# Patient Record
Sex: FEMALE | Race: WHITE | Employment: UNEMPLOYED | URBAN - METROPOLITAN AREA
[De-identification: names, ages, dates, MRNs, and addresses within clinical notes are randomized per-mention and may not be internally consistent; named-entity substitution may affect disease eponyms.]

---

## 2022-12-10 ENCOUNTER — HOSPITAL ENCOUNTER (EMERGENCY)
Age: 37
Discharge: HOME | End: 2022-12-10
Attending: EMERGENCY MEDICINE
Payer: MEDICARE

## 2022-12-10 VITALS
WEIGHT: 168 LBS | HEART RATE: 83 BPM | DIASTOLIC BLOOD PRESSURE: 77 MMHG | RESPIRATION RATE: 18 BRPM | OXYGEN SATURATION: 98 % | SYSTOLIC BLOOD PRESSURE: 131 MMHG | TEMPERATURE: 98.1 F

## 2022-12-10 DIAGNOSIS — J06.9 UPPER RESPIRATORY TRACT INFECTION, UNSPECIFIED TYPE: ICD-10-CM

## 2022-12-10 DIAGNOSIS — K08.89 PAIN, DENTAL: Primary | ICD-10-CM

## 2022-12-10 PROCEDURE — 6360000002 HC RX W HCPCS: Performed by: EMERGENCY MEDICINE

## 2022-12-10 PROCEDURE — 99284 EMERGENCY DEPT VISIT MOD MDM: CPT

## 2022-12-10 PROCEDURE — 96372 THER/PROPH/DIAG INJ SC/IM: CPT

## 2022-12-10 PROCEDURE — 6370000000 HC RX 637 (ALT 250 FOR IP): Performed by: EMERGENCY MEDICINE

## 2022-12-10 RX ORDER — NAPROXEN 500 MG/1
500 TABLET ORAL 2 TIMES DAILY WITH MEALS
Qty: 60 TABLET | Refills: 0 | Status: SHIPPED | OUTPATIENT
Start: 2022-12-10

## 2022-12-10 RX ORDER — GUAIFENESIN 600 MG/1
600 TABLET, EXTENDED RELEASE ORAL ONCE
Status: COMPLETED | OUTPATIENT
Start: 2022-12-10 | End: 2022-12-10

## 2022-12-10 RX ORDER — AMOXICILLIN 250 MG/1
500 CAPSULE ORAL ONCE
Status: COMPLETED | OUTPATIENT
Start: 2022-12-10 | End: 2022-12-10

## 2022-12-10 RX ORDER — AMOXICILLIN 250 MG/1
500 CAPSULE ORAL 2 TIMES DAILY
Qty: 28 CAPSULE | Refills: 0 | Status: SHIPPED | OUTPATIENT
Start: 2022-12-10 | End: 2022-12-17

## 2022-12-10 RX ORDER — KETOROLAC TROMETHAMINE 30 MG/ML
30 INJECTION, SOLUTION INTRAMUSCULAR; INTRAVENOUS ONCE
Status: COMPLETED | OUTPATIENT
Start: 2022-12-10 | End: 2022-12-10

## 2022-12-10 RX ORDER — GUAIFENESIN AND DEXTROMETHORPHAN HYDROBROMIDE 600; 30 MG/1; MG/1
1 TABLET, EXTENDED RELEASE ORAL 2 TIMES DAILY
Qty: 28 TABLET | Refills: 0 | Status: SHIPPED | OUTPATIENT
Start: 2022-12-10

## 2022-12-10 RX ADMIN — KETOROLAC TROMETHAMINE 30 MG: 30 INJECTION, SOLUTION INTRAMUSCULAR; INTRAVENOUS at 04:44

## 2022-12-10 RX ADMIN — AMOXICILLIN 500 MG: 250 CAPSULE ORAL at 04:44

## 2022-12-10 RX ADMIN — GUAIFENESIN 600 MG: 600 TABLET, EXTENDED RELEASE ORAL at 04:44

## 2022-12-10 NOTE — ED PROVIDER NOTES
Triage Chief Complaint:   Otalgia (Right ear) and URI (X3 days)    [place]:  Jayleen Gurrola is a 40 y.o. female that presents with earache, dental pains, sore throat and cough. Patient was in baseline state of health until approximately 3 days ago when she developed a dry cough. Patient reports that both of her ear started to hurt with right ear worse than left ear. Patient reports that she feels like her ears are \"popping\". Additionally, patient reports diffuse dental pains to bilateral lower jawline's. Cough is mostly dry and nonproductive. No fevers or chills. No vomiting or diarrhea. Patient does live with a roommate currently who son is sick with respiratory symptoms. Patient does have underlying cerebral palsy but no known pulmonary disease. Patient does vape. ROS:  General:  No fevers, no chills, no weakness  Eyes:  No recent vison changes, + discharge  ENT:  + sore throat, + nasal congestion, no hearing changes, + dental pain, + earache  Cardiovascular:  No chest pain, no palpitations  Respiratory:  No shortness of breath, + cough, no wheezing  Gastrointestinal:  No pain, no nausea, no vomiting, no diarrhea  Musculoskeletal:  No muscle pain, no joint pain  Skin:  No rash, no pruritis, no easy bruising  Neurologic:  No speech problems, no headache, no extremity numbness, no extremity tingling, no extremity weakness  Psychiatric:  No anxiety  Genitourinary:  No dysuria, no hematuria  Endocrine:  No unexpected weight gain, no unexpected weight loss  Extremities:  no edema, no pain    History reviewed. No pertinent past medical history. History reviewed. No pertinent surgical history. History reviewed. No pertinent family history.   Social History     Socioeconomic History    Marital status: Single     Spouse name: Not on file    Number of children: Not on file    Years of education: Not on file    Highest education level: Not on file   Occupational History    Not on file   Tobacco Use    Smoking status: Every Day     Types: Cigarettes    Smokeless tobacco: Never   Vaping Use    Vaping Use: Every day    Substances: Nicotine   Substance and Sexual Activity    Alcohol use: Never    Drug use: Never    Sexual activity: Not on file   Other Topics Concern    Not on file   Social History Narrative    Not on file     Social Determinants of Health     Financial Resource Strain: Not on file   Food Insecurity: Not on file   Transportation Needs: Not on file   Physical Activity: Not on file   Stress: Not on file   Social Connections: Not on file   Intimate Partner Violence: Not on file   Housing Stability: Not on file     Current Facility-Administered Medications   Medication Dose Route Frequency Provider Last Rate Last Admin    amoxicillin (AMOXIL) capsule 500 mg  500 mg Oral Once Darshana Iglesias MD        ketorolac (TORADOL) injection 30 mg  30 mg IntraMUSCular Once Darshana Iglesias MD        guaiFENesin [facility]) extended release tablet 600 mg  600 mg Oral Once Darshana Iglesias MD         Current Outpatient Medications   Medication Sig Dispense Refill    amoxicillin (AMOXIL) 250 MG capsule Take 2 capsules by mouth 2 times daily for 7 days 28 capsule 0    naproxen (NAPROSYN) 500 MG tablet Take 1 tablet by mouth 2 times daily (with meals) 60 tablet 0    Dextromethorphan-guaiFENesin (MUCINEX DM)  MG TB12 Take 1 tablet by mouth 2 times daily 28 tablet 0     Allergies   Allergen Reactions    Codeine Hives    Morphine Nausea And Vomiting       Nursing Notes Reviewed    Physical Exam:  ED Triage Vitals [12/10/22 6183]   Enc Vitals Group      /77      Heart Rate 83      Resp 18      Temp 98.1 °F (36.7 °C)      Temp Source Oral      SpO2 98 %      Weight 168 lb (76.2 kg)      Height       Head Circumference       Peak Flow       Pain Score       Pain Loc       Pain Edu? Excl. in [address]? My pulse ox interpretation is - normal    General appearance:  No acute distress.   Appears slightly deconditioned but overall nontoxic. Skin:  Warm. Dry. Eye:  Extraocular movements intact. Ears, nose, mouth and throat: TMs are clear bilaterally and pearly white. Oral mucosa moist.  There is overall very poor oral hygiene. There are scattered plaques and caries. There is gumline erythema diffusely. No periapical abscesses or areas of fluctuance. Missing right upper premolar tooth. Soft floor of mouth. Tolerating oral secretions. Posterior oropharynx is with some mild erythema but no edema or exudates. No hot potato voice. Neck:  Trachea midline. No meningismus or rigidity. Extremity:  No swelling. Normal ROM     Heart:  Regular rate and rhythm, normal S1 & S2, no extra heart sounds. Perfusion:  Intact. Respiratory:  Lungs clear to auscultation bilaterally. Respirations nonlabored. Moving good air bilaterally. Speaking clearly in full sentences. Abdominal:  Normal bowel sounds. Soft. Nontender. Non distended. Back:  No CVA tenderness to palpation     Neurological:  Alert and oriented times 3. Moving x4 extremities. Psychiatric:  Appropriate    I have reviewed and interpreted all of the currently available lab results from this visit (if applicable):  No results found for this visit on 12/10/22. Radiographs (if obtained):  [] The following radiograph was interpreted by myself in the absence of a radiologist:   [] Radiologist's Report Reviewed:  No orders to display         EKG (if obtained): (All EKG's are interpreted by myself in the absence of a cardiologist)    Chart review shows recent radiographs:  No results found. MDM:  Pt presents as above. Emergent conditions considered. Presentation prompted initial history and physical.  Presentation consistent with diffuse dental pains with overall poor oral hygiene I will cover with antibiotics for this. Additionally, patient is likely with viral upper respiratory infection and this was discussed with patient.     Patient is here with likely viral syndrome with suspicion for COVID-19 vs influenza vs rhinovirus vs rsv as all are circulating in the community at this time. Patient is overall hemodynamically stable and well-appearing with stable vital signs on room air. Patient appears well-hydrated and is tolerating p.o. Patient currently does not meet inpatient criteria for further evaluation and treatment and is appropriate for further outpatient follow-up and close monitoring of symptoms. I did encourage patient to isolate until symptoms resolve. Patient encouraged to return to the emergency department for any new or worsening symptoms including increasing chest pain or shortness of breath or inability to tolerate p.o. I discussed specific signs and symptoms on when to return to the emergency department as well as the need for close outpatient follow-up. Questions sought and answered with the patient. They voice understanding and agree with plan. Is this patient to be included in the SEP-1 Core Measure due to severe sepsis or septic shock? No   Exclusion criteria - the patient is NOT to be included for SEP-1 Core Measure due to:  Viral etiology found or highly suspected (including COVID-19) without concomitant bacterial infection          Care of this patient did occur during COVID-19 pandemic. Clinical Impression:  1. Pain, dental    2. Upper respiratory tract infection, unspecified type      Disposition referral (if applicable):   Your Primary Care Physician    Schedule an appointment as soon as possible for a visit   [facility]  [address]  [phone]  Today  If symptoms worsen    Disposition medications (if applicable):  New Prescriptions    AMOXICILLIN (AMOXIL) 250 MG CAPSULE    Take 2 capsules by mouth 2 times daily for 7 days    DEXTROMETHORPHAN-GUAIFENESIN (MUCINEX DM)  MG TB12    Take 1 tablet by mouth 2 times daily NAPROXEN (NAPROSYN) 500 MG TABLET    Take 1 tablet by mouth 2 times daily (with meals)       Comment: Please note this report has been produced using speech recognition software and may contain errors related to that system including errors in grammar, punctuation, and spelling, as well as words and phrases that may be inappropriate. If there are any questions or concerns please feel free to contact the dictating provider for clarification.         Serena Dove MD  12/10/22 2427